# Patient Record
Sex: MALE
[De-identification: names, ages, dates, MRNs, and addresses within clinical notes are randomized per-mention and may not be internally consistent; named-entity substitution may affect disease eponyms.]

---

## 2019-07-26 ENCOUNTER — HOSPITAL ENCOUNTER (OUTPATIENT)
Dept: HOSPITAL 92 - BICCT | Age: 39
Discharge: HOME | End: 2019-07-26
Attending: FAMILY MEDICINE
Payer: COMMERCIAL

## 2019-07-26 DIAGNOSIS — M79.18: Primary | ICD-10-CM

## 2019-07-26 DIAGNOSIS — Z98.890: ICD-10-CM

## 2019-07-26 PROCEDURE — 72193 CT PELVIS W/DYE: CPT

## 2019-07-26 NOTE — CT
CT OF PELVIS PERFORMED WITH CONTRAST ENHANCEMENT:

 

Date:  07/26/19 

 

HISTORY:  

Patient has a history of perirectal abscess with surgery. Left buttock pain. Surgery for the abscess 
in May and has developed severe left buttock and lower back pain. 

 

COMPARISON:  

None. 

 

FINDINGS:

Small hypodensity seen involving lower pole of right kidney, most likely a small cyst. There are some
 small nonspecific ileocolic lymph nodes present. There is no free fluid in the pelvis and there are 
no signs of any pelvic lymphadenopathy or mass. Prostate does not appear enlarged. 

 

I see no signs of any recurrent perirectal abscess. No signs of any type of fistula or soft tissue ma
ss. Minimal nonspecific fat stranding within the medial aspects of the fat of both buttock regions. T
his is fairly symmetric. 

 

Review of osseous structures shows no significant findings. 

 

IMPRESSION: 

Unremarkable CT of the pelvis. 

 

 

POS: [facility]